# Patient Record
Sex: FEMALE | URBAN - METROPOLITAN AREA
[De-identification: names, ages, dates, MRNs, and addresses within clinical notes are randomized per-mention and may not be internally consistent; named-entity substitution may affect disease eponyms.]

---

## 2023-08-17 ENCOUNTER — OUT OF OFFICE VISIT (OUTPATIENT)
Dept: URBAN - METROPOLITAN AREA MEDICAL CENTER 27 | Facility: MEDICAL CENTER | Age: 33
End: 2023-08-17

## 2023-08-17 ENCOUNTER — CLAIMS CREATED FROM THE CLAIM WINDOW (OUTPATIENT)
Dept: URBAN - METROPOLITAN AREA MEDICAL CENTER 27 | Facility: MEDICAL CENTER | Age: 33
End: 2023-08-17
Payer: COMMERCIAL

## 2023-08-17 DIAGNOSIS — K52.89 (LYMPHOCYTIC) MICROSCOPIC COLITIS: ICD-10-CM

## 2023-08-17 DIAGNOSIS — R10.13 ABDOMINAL DISCOMFORT, EPIGASTRIC: ICD-10-CM

## 2023-08-17 PROCEDURE — 99213 OFFICE O/P EST LOW 20 MIN: CPT | Performed by: INTERNAL MEDICINE

## 2023-08-28 ENCOUNTER — CLAIMS CREATED FROM THE CLAIM WINDOW (OUTPATIENT)
Dept: URBAN - METROPOLITAN AREA CLINIC 23 | Facility: CLINIC | Age: 33
End: 2023-08-28

## 2023-08-28 ENCOUNTER — OFFICE VISIT (OUTPATIENT)
Dept: URBAN - METROPOLITAN AREA CLINIC 23 | Facility: CLINIC | Age: 33
End: 2023-08-28

## 2023-08-28 ENCOUNTER — TELEPHONE ENCOUNTER (OUTPATIENT)
Dept: URBAN - METROPOLITAN AREA CLINIC 23 | Facility: CLINIC | Age: 33
End: 2023-08-28

## 2023-08-28 ENCOUNTER — WEB ENCOUNTER (OUTPATIENT)
Dept: URBAN - METROPOLITAN AREA CLINIC 23 | Facility: CLINIC | Age: 33
End: 2023-08-28

## 2023-08-28 VITALS
HEIGHT: 64 IN | HEART RATE: 89 BPM | WEIGHT: 193.3 LBS | BODY MASS INDEX: 33 KG/M2 | TEMPERATURE: 97.5 F | SYSTOLIC BLOOD PRESSURE: 115 MMHG | DIASTOLIC BLOOD PRESSURE: 74 MMHG

## 2023-08-28 DIAGNOSIS — K52.9 ILEITIS: ICD-10-CM

## 2023-08-28 RX ORDER — SODIUM, POTASSIUM,MAG SULFATES 17.5-3.13G
17.5-13.3-1.6 GM/177ML SOLUTION, RECONSTITUTED, ORAL ORAL AS DIRECTED
Qty: 354 MILLILITER | Refills: 0 | OUTPATIENT
Start: 2023-08-28

## 2023-08-28 NOTE — HPI-TODAY'S VISIT:
33-year-old female presents for inflammatory changes in the ileum, appendix, cecum.  She presents to the hospital with acute onset abdominal pain starting from epigastric down to right lower quadrant area, had appendectomy.  Found inflammation in the distal ileum.  Recommended to have a GI work-up.  Currently she is doing well.  No pain no diarrhea. No previous GI issues.  She had 1 bowel movement solid stool yesterday.  Denies NSAID use.

## 2023-08-28 NOTE — PREVIOUS WORKUP REVIEWED
Reviewed External Medical Record  IMAGES  -CT abdomen pelvis with contrast 8/16/2023: Significant wall thickening and inflammation surrounding the cecum and ileocecal valve.  Borderline thickening of the appendix measuring 8 mm.  Hyperdense enhancement of the appendiceal mucosa.  Multiple prominent lymph nodes in the right lower abdomen.   LABS  -Labs 8/16/2023: WBC 9.5, hemoglobin 12.1, platelet 276, BUN 6, creatinine 0.8, total protein 8, albumin 4.2, total bilirubin 0.7, alkaline phosphatase 80, AST 32, ALT 23, lipase 33.   SURGERY  -Appendectomy 8/16/2023: Normal appendix.  Mild inflammatory changes at the base of the appendix.  Patchy erythema to the ascending colon possibly indicative of colitis.

## 2023-08-28 NOTE — PHYSICAL EXAM GASTROINTESTINAL
Abdomen- soft, nontender, nondistended , no guarding or rigidity , no masses palpable , normal bowel sounds
no loss of consciousness, no gait abnormality, no headache, no sensory deficits, and no weakness.

## 2023-10-02 ENCOUNTER — LAB OUTSIDE AN ENCOUNTER (OUTPATIENT)
Dept: URBAN - METROPOLITAN AREA CLINIC 23 | Facility: CLINIC | Age: 33
End: 2023-10-02

## 2023-10-10 ENCOUNTER — LAB OUTSIDE AN ENCOUNTER (OUTPATIENT)
Dept: URBAN - METROPOLITAN AREA CLINIC 23 | Facility: CLINIC | Age: 33
End: 2023-10-10

## 2023-11-17 ENCOUNTER — OFFICE VISIT (OUTPATIENT)
Dept: URBAN - METROPOLITAN AREA SURGERY CENTER 15 | Facility: SURGERY CENTER | Age: 33
End: 2023-11-17